# Patient Record
Sex: MALE | Race: WHITE | NOT HISPANIC OR LATINO
[De-identification: names, ages, dates, MRNs, and addresses within clinical notes are randomized per-mention and may not be internally consistent; named-entity substitution may affect disease eponyms.]

---

## 2018-10-08 ENCOUNTER — APPOINTMENT (OUTPATIENT)
Dept: PULMONOLOGY | Facility: CLINIC | Age: 43
End: 2018-10-08
Payer: COMMERCIAL

## 2018-10-08 VITALS
HEIGHT: 73 IN | BODY MASS INDEX: 38.17 KG/M2 | SYSTOLIC BLOOD PRESSURE: 134 MMHG | WEIGHT: 288 LBS | DIASTOLIC BLOOD PRESSURE: 87 MMHG | TEMPERATURE: 97.4 F | HEART RATE: 75 BPM | RESPIRATION RATE: 18 BRPM | OXYGEN SATURATION: 75 %

## 2018-10-08 PROCEDURE — 99215 OFFICE O/P EST HI 40 MIN: CPT

## 2018-11-05 ENCOUNTER — APPOINTMENT (OUTPATIENT)
Dept: SLEEP CENTER | Facility: CLINIC | Age: 43
End: 2018-11-05

## 2018-11-07 ENCOUNTER — CHART COPY (OUTPATIENT)
Age: 43
End: 2018-11-07

## 2021-03-02 ENCOUNTER — APPOINTMENT (OUTPATIENT)
Dept: PULMONOLOGY | Facility: CLINIC | Age: 46
End: 2021-03-02
Payer: COMMERCIAL

## 2021-03-02 DIAGNOSIS — E66.9 OBESITY, UNSPECIFIED: ICD-10-CM

## 2021-03-02 DIAGNOSIS — G47.33 OBSTRUCTIVE SLEEP APNEA (ADULT) (PEDIATRIC): ICD-10-CM

## 2021-03-02 PROCEDURE — 99215 OFFICE O/P EST HI 40 MIN: CPT | Mod: 95

## 2021-03-02 NOTE — HISTORY OF PRESENT ILLNESS
[Home] : at home, [unfilled] , at the time of the visit. [Medical Office: ([facility])___] : at the medical office located in  [Verbal consent obtained from patient] : the patient, [unfilled] [FreeTextEntry1] : 47yo M with history of anxiety, obesity, weight gain, heavy snoring and chronic nasal congestion with deviated septum and nasal polyps. He was diagnosed with very severe DAVE (OXANA 103/h) in 10/2015 for which he  is currently on CPAP nightly. He uses the CPAP every single night and notices improvement in his daytime energy and function. For nasal congestion, he underwent sinus surgery which helped his symptoms tremendously\par \par He is trying his best to lose weight. He exercises avidly -- peloton, skiing, treadmill but trying to also improve his diet\par \par Data from machine was reviewed and is as follows:\par DME -- [facility]\par Device -- Dreamstation CPAP pro\par Percent days with device usage in last 30 days -- 100%\par Average usage (days used) -- 8h 47min\par Average treatment related OXANA -- 1.7/h\par Mask leakage -- 24 min per night\par Pressure -- 12.5 cm h20 EPR 3\par

## 2021-03-02 NOTE — ASSESSMENT
[FreeTextEntry1] : 45yo M with history of anxiety, obesity, weight gain, heavy snoring and chronic nasal congestion with deviated septum and nasal polyps. He was diagnosed with very severe DAVE (OXANA 103/h) in 10/2015 for which he  is currently on CPAP nightly. He uses the CPAP every single night and notices improvement in his daytime energy and function. For nasal congestion, he underwent sinus surgery which helped his symptoms tremendously\par \par He is trying his best to lose weight. He exercises avidly -- peloton, skiing, treadmill but trying to also improve his diet\par \par Data from machine was reviewed and is as follows:\par DME -- [facility]\par Device -- Dreamstation CPAP pro\par Percent days with device usage in last 30 days -- 100%\par Average usage (days used) -- 8h 47min\par Average treatment related OXANA -- 1.7/h\par Mask leakage -- 24 min per night\par Pressure -- 12.5 cm h20 EPR 3\par \par \par DAVE on CPAP\par Patient to continue as he is deriving benefit\par Compliance and therapy data discussed in detail with patient\par I explained the rationale for treatment of DAVE -- to improve quality of life, daytime function and to decrease the cardiometabolic and other medical risks that are associated with untreated DAVE. The patient verbalized understanding.\par \par Obesity\par I explained the relationship between obesity and obstructive sleep apnea and the role of weight loss in improving severity of DAVE.\par Advice given on weight loss strategies\par

## 2021-03-02 NOTE — REVIEW OF SYSTEMS
[Obesity] : obesity [Negative] : Psychiatric [Difficulty Initiating Sleep] : no difficulty falling asleep [Lower Extremity Discomfort] : no lower extremity discomfort [Unusual Sleep Behavior] : no unusual sleep behavior [Cataplexy] :  no cataplexy

## 2022-03-17 ENCOUNTER — APPOINTMENT (OUTPATIENT)
Dept: PULMONOLOGY | Facility: CLINIC | Age: 47
End: 2022-03-17
Payer: COMMERCIAL

## 2022-03-17 PROCEDURE — 99213 OFFICE O/P EST LOW 20 MIN: CPT | Mod: 95

## 2022-03-17 RX ORDER — CLONAZEPAM 0.5 MG/1
0.5 TABLET ORAL
Refills: 0 | Status: ACTIVE | COMMUNITY

## 2022-03-17 NOTE — HISTORY OF PRESENT ILLNESS
[Awakes Unrefreshed] : awakening unrefreshed [Awakening With Dry Mouth] : awakening with dry mouth [Recent  Weight Gain] : recent weight gain [To Bed: ___] : ~he/she~ goes to bed at [unfilled] [Arises: ___] : arises at [unfilled] [Sleep Onset Latency: ___ minutes] : sleep onset latency of [unfilled] minutes reported [Nocturnal Awakenings: ___] : ~he/she~ typically has [unfilled] nocturnal awakenings [WASO: ___] : Wake time after sleep onset is [unfilled] [TST: ___] : Total sleep time is [unfilled] [Daytime Sleep: ___] : daytime sleep: [unfilled] [Home] : at home, [unfilled] , at the time of the visit. [Medical Office: ([facility])___] : at the medical office located in  [Verbal consent obtained from patient] : the patient, [unfilled] [Obstructive Sleep Apnea] : obstructive sleep apnea [FreeTextEntry1] : 47 year old male DARIUSZ TYLER with obstructive sleep apnea treated with CPAP since , on annual follow up.\par •	History of: Hypothyroidism, anxiety, obesity class III.\par \par SEVERE DAVE (.7) on CPAP 12.5 cmH2O using a full-face mask.\par \par Patient reports CPAP "gave me my life back, I cannot function without it".  He registered his recalled CPAP a few days ago with [facility] for replacement.  He c/o not sleeping well ("3 young kids"), feeling unrefreshed, undergoing a lot of stress, and mask leaking.  He had self-lowered the pressure to 12.5 after losing weight, then last night increased it back to 14 cmH2O after gaining weight and slept better - current stated weight 310-lbs (BMI 41).  Sleeping 6-8 hours without insomnia or daytime sleep.\par \par •	10/15/2015 HST: .7.  T90 80.3%. Lowest sat 55%.\par •	2015 Home Titration: AHI 0.9 on CPAP 14 cmH2O. F&P Simplus M FFM\par •	Treatment: 2015 DreamStation CPAP Pro from [facility]. (recalled)\par •	Compliance Data: 100% of days, 96.7% for >=4 hours, average 8 hrs 3 mins.\par •	Therapy Data: AHI 2.2. Average 1-hr 11 mins in large leak.\par \par EPWORTH SLEEPINESS SCALE\par \par How likely are you to doze off or fall asleep in the situations described below, in contrast to feeling just tired?  This refers to your usual way of life in recent times.  Even if you haven't done some of these things recently, try to work out how they would have affected you.  Use the following scale to choose one most appropriate number for each situation.......Chance of dozin= never. 1= slight. 2= moderate. 3= high.\par \par CHANCE OF DOZING............SITUATION\par 0.............................................Sitting and reading\par 0.............................................Watching TV\par 0.............................................Sitting inactive in a public place (eg a theatre or a meeting)\par 0.............................................As a passenger in a car for an hour without a break\par 1.............................................Lying down to rest in the afternoon when circumstances permit\par 0.............................................Sitting and talking to someone\par 0.............................................Sitting quietly after lunch without alcohol\par 0.............................................In a car, while stopped for a few minutes in traffic\par \par 1............................................TOTAL ESS SCORE [Snoring] : no snoring [Witnessed Apneas] : no witnessed sleep apnea [Frequent Nocturnal Awakening] : no nocturnal awakening [Unintentional Sleep while Active] : no unintentional sleep while active [Unintentional Sleep While Inactive] : no unintentional sleep while inactive [Awakes with Headache] : no headache upon awakening [Daytime Somnolence] : no daytime somnolence [ESS] : 1

## 2022-03-17 NOTE — REVIEW OF SYSTEMS
[Fatigue] : fatigue [Palpitations] : palpitations [Obesity] : obesity [Thyroid Disease] : thyroid disease [Depression] : depression [Anxious] : anxious [Negative] : Musculoskeletal [Diabetes] : no diabetes  [Difficulty Initiating Sleep] : no difficulty falling asleep [Difficulty Maintaining Sleep] : no difficulty maintaining sleep [Irresistible urge to move legs] : no irresistible urge to move legs because of lower extremity discomfort [Unusual Sleep Behavior] : no unusual sleep behavior [FreeTextEntry8] : recent sore throat [FreeTextEntry9] : attributed to anxiety [de-identified] : recent headaches

## 2022-03-17 NOTE — ASSESSMENT
[FreeTextEntry1] : 47 year old DARIUSZ TYLER continues to derive benefit from CPAP therapy for obstructive sleep apnea since 2015.\par \par SEVERE DAVE (.7) on CPAP 14 cmH2O using a full-face mask.\par \par ·	Patient "cannot sleep without CPAP".  [place] Sleepiness Scale score is 1. \par ·	Excellent compliance (100% of days for > 8-hours)\par ·	excellent therapeutic response (AHI of 2.2), and excessive mask leak (>1-hour).    \par \par PLAN:\par ·	Patient declined a formal mask fitting.  He will switch to a new mask.\par ·	Switch to [facility] to replace >4 yo CPAP to continue therapy at 14 cmH2O.\par ·	Patient is aware there is a delay in acquiring new CPAP.\par ·	Follow-up annually or sooner if needed.\par \par \par \par \par

## 2022-10-20 ENCOUNTER — APPOINTMENT (OUTPATIENT)
Dept: PULMONOLOGY | Facility: CLINIC | Age: 47
End: 2022-10-20

## 2022-10-20 DIAGNOSIS — G47.33 OBSTRUCTIVE SLEEP APNEA (ADULT) (PEDIATRIC): ICD-10-CM

## 2022-10-20 DIAGNOSIS — Z99.89 OBSTRUCTIVE SLEEP APNEA (ADULT) (PEDIATRIC): ICD-10-CM

## 2022-10-20 PROCEDURE — 99212 OFFICE O/P EST SF 10 MIN: CPT | Mod: 95

## 2022-10-20 RX ORDER — FLUOXETINE HYDROCHLORIDE 10 MG/1
10 CAPSULE ORAL
Qty: 90 | Refills: 0 | Status: COMPLETED | COMMUNITY
Start: 2022-09-28 | End: 2022-10-20

## 2022-10-20 RX ORDER — FLUOXETINE HYDROCHLORIDE 20 MG/1
20 CAPSULE ORAL EVERY MORNING
Refills: 0 | Status: ACTIVE | COMMUNITY

## 2022-10-20 NOTE — HISTORY OF PRESENT ILLNESS
[Obstructive Sleep Apnea] : obstructive sleep apnea [Awakes Unrefreshed] : awakening unrefreshed [To Bed: ___] : ~he/she~ goes to bed at [unfilled] [Arises: ___] : arises at [unfilled] [Sleep Onset Latency: ___ minutes] : sleep onset latency of [unfilled] minutes reported [Nocturnal Awakenings: ___] : ~he/she~ typically has [unfilled] nocturnal awakenings [Daytime Sleep: ___] : daytime sleep: [unfilled] [AHI: ___ per hour] : Apnea-hypopnea index:  [unfilled] per hour [T90%: ___] : T90%: [unfilled]% [Elbert desatuation%: ___] : Elbert desaturation:  [unfilled]% [Date: ___] : the most recent therapeutic polysomnogram was completed [unfilled] [WASO: ___] : Wake time after sleep onset is [unfilled] [TST: ___] : Total sleep time is [unfilled] [CPAP: ___ cmH2O] : CPAP: [unfilled] cmH2O [% Days used: ____] : Days used: [unfilled] % [% Days used > 4 hrs: ____] : Days used > 4 hrs: [unfilled] % [Mean nightly usage: ___ hrs] : Mean nightly usage: [unfilled] hrs [Therapy based AHI: ___ /hr] : Therapy based AHI: [unfilled] / hr [Snoring] : no snoring [Witnessed Apneas] : no witnessed sleep apnea [Frequent Nocturnal Awakening] : no nocturnal awakening [Unintentional Sleep while Active] : no unintentional sleep while active [Unintentional Sleep While Inactive] : no unintentional sleep while inactive [Awakes with Headache] : no headache upon awakening [Awakening With Dry Mouth] : no dry mouth upon awakening [Recent  Weight Gain] : no recent weight gain [Daytime Somnolence] : no daytime somnolence [DIS] : no DIS [DMS] : no DMS [Unusual Sleep Behavior] : no unusual sleep behavior [Lower Extremity Discomfort] : no lower extremity discomfort in evening or at bedtime [ESS] : 1 [FreeTextEntry1] : 30-day CPAP compliance data through 10.18.2022.  Average leak 25 mins.

## 2022-10-20 NOTE — REVIEW OF SYSTEMS
[Obesity] : obesity [Thyroid Disease] : thyroid disease [Anxious] : anxious [Negative] : Musculoskeletal [FreeTextEntry3] : 3 [de-identified] : current stated weight 313-lbs

## 2022-10-20 NOTE — REASON FOR VISIT
[Follow-Up] : a follow-up visit [Sleep Apnea] : sleep apnea [Home] : at home, [unfilled] , at the time of the visit. [Medical Office: ([facility])___] : at the medical office located in  [Patient] : the patient

## 2022-10-20 NOTE — ASSESSMENT
[FreeTextEntry1] : 47 year old DARIUSZ TYLER continues to derive benefit from CPAP 14 cmH2O since 2015 for SEVERE OBSTRUCTIVE SLEEP APNEA (.7), using a full-face mask.  \par \par History of Hypothyroidism, anxiety, and obesity class III.\par \par Prior hypersomnolence, frequent awakenings, and snoring have resolved.  He still awakens unrefreshed after 7-8 hours of sleep.  [place] Sleepiness Scale score is 1.  \par DreamStation 2 Auto CPAP  Data download:  Excellent compliance (100% days, 100% >4-hrs, average 8h 18m) and therapeutic response (AHI 1.8). on CPAP 14 cmH2O. Leak average 25m.  \par \par Patient expressed interest in losing weight.\par \par PLAN:\par \par emailed [facility] referral.\par [facility] PAP resupply to continue therapy at current settings.\par Follow-up annually or sooner if needed.\par \par \par \par \par